# Patient Record
Sex: MALE | URBAN - METROPOLITAN AREA
[De-identification: names, ages, dates, MRNs, and addresses within clinical notes are randomized per-mention and may not be internally consistent; named-entity substitution may affect disease eponyms.]

---

## 2017-02-10 ENCOUNTER — TRANSFERRED RECORDS (OUTPATIENT)
Dept: HEALTH INFORMATION MANAGEMENT | Facility: CLINIC | Age: 32
End: 2017-02-10

## 2017-02-15 ENCOUNTER — TRANSFERRED RECORDS (OUTPATIENT)
Dept: HEALTH INFORMATION MANAGEMENT | Facility: CLINIC | Age: 32
End: 2017-02-15

## 2017-02-17 ENCOUNTER — PRE VISIT (OUTPATIENT)
Dept: NEUROLOGY | Facility: CLINIC | Age: 32
End: 2017-02-17

## 2017-02-17 NOTE — TELEPHONE ENCOUNTER
1.  Date/reason for appt:2/27/17, Phabdomyolysis  2.  Referring provider: GOVIND RODRIGUEZ  3.  Call to patient (Yes / No - short description):No, referred  4.  Previous care at / records requested from:   [facility]- faxed cover sheet

## 2017-02-20 NOTE — TELEPHONE ENCOUNTER
Records received from [facility].   Included:  ED notes- 1/6/16 to 1/15/16, 2/10/17 to 2/15/17  Radiology reports- CT abdomen pelvis on 1/7/16, US abdomen on 1/7/16, US venous upper ext right on 2/10/17  Other- Labs

## 2017-02-22 ASSESSMENT — ENCOUNTER SYMPTOMS
BACK PAIN: 0
POLYDIPSIA: 0
JOINT SWELLING: 1
HALLUCINATIONS: 0
FATIGUE: 0
NIGHT SWEATS: 0
INCREASED ENERGY: 1
DECREASED APPETITE: 0
WEIGHT GAIN: 0
MYALGIAS: 1
FEVER: 0
POLYPHAGIA: 0
MUSCLE CRAMPS: 0
ALTERED TEMPERATURE REGULATION: 0
MUSCLE WEAKNESS: 1
ARTHRALGIAS: 0
CHILLS: 0
NECK PAIN: 0
STIFFNESS: 1
WEIGHT LOSS: 0

## 2017-02-27 ENCOUNTER — APPOINTMENT (OUTPATIENT)
Dept: LAB | Facility: CLINIC | Age: 32
End: 2017-02-27
Attending: GENETIC COUNSELOR, MS
Payer: COMMERCIAL

## 2017-02-27 ENCOUNTER — OFFICE VISIT (OUTPATIENT)
Dept: NEUROLOGY | Facility: CLINIC | Age: 32
End: 2017-02-27

## 2017-02-27 VITALS
DIASTOLIC BLOOD PRESSURE: 81 MMHG | HEIGHT: 72 IN | SYSTOLIC BLOOD PRESSURE: 130 MMHG | HEART RATE: 64 BPM | BODY MASS INDEX: 24.38 KG/M2 | WEIGHT: 180 LBS

## 2017-02-27 DIAGNOSIS — G73.7 METABOLIC MYOPATHY: ICD-10-CM

## 2017-02-27 DIAGNOSIS — M62.82 NON-TRAUMATIC RHABDOMYOLYSIS: Primary | ICD-10-CM

## 2017-02-27 DIAGNOSIS — E88.9 METABOLIC MYOPATHY: ICD-10-CM

## 2017-02-27 ASSESSMENT — PAIN SCALES - GENERAL: PAINLEVEL: NO PAIN (1)

## 2017-02-27 NOTE — NURSING NOTE
Chief Complaint   Patient presents with     Consult     Muscular dystrophy    Shelly Bro, ABIDA

## 2017-02-27 NOTE — PROGRESS NOTES
2017             Sarah Cadet MD   [address] [address]      RE: Jose Gunn   MRN: [id number]   : 1985      Dear Dr. Cadet:      I had the pleasure to see Mr. Gunn at the [facility] today for recurrent rhabdomyolysis.  As you know, he is a 31-year-old, otherwise healthy male who has had 2 episodes of serious rhabdomyolysis in his life.  The first occurred in 2016 and required a 10 day hospitalization with hydration, and the second was in 2017 and required a 5-6 day hospitalization.  Prior to both occasions, he worked with a new gym , and he did exercise that he was unaccustomed to.  This included the first time repeated weightlifting and arm workout on a Monday followed by leg workout with bench presses/squatting on Wednesday.  The duration of exercise was about an hour to an hour and a half each time.  Shortly after the Wednesday in 2016, he felt severe soreness in his muscle, and he noticed discoloration in his urine.  He presented to a local ED in [place], and he had rhabdomyolsis with CK in the 120,000's.  He did not develop renal failure.  His symptoms resolved with hydration.  His CK normalized about a month to a month and a half later to 100-150.  His myalgias persisted to a milder extent for 2-3 months and then resolved.  He was able then to resume his workout at similar levels without limitations.  He did not describe second-wind phenomenon.  Then, by doing about the same amount of workout in 2017, he developed a similar presentation with CK in the 70,000's.  He again presented to [place].  He was hydrated, did not develop renal failure, and symptoms improved within a few days.  He now still has muscle soreness, and he has been prescribed oxycodone, which he takes p.r.n. but he was able to return to his work as a . He has stopped doing workouts.     His social history is significant  for drinking 3-5 alcoholic drinks a day, including beers or shots.  He used to smoke until 3 years ago, when he quit.  He has used cocaine and marijuana.  He adamantly denied cocaine use prior to the 2 rhabdo episodes in 01/2016 and 02/2017.  His past medical history is otherwise free.  He has a maternal uncle who possibly had similar myalgias and exercise intolerance, although he never developed clear-cut rhabdo to the best they know.  Otherwise, his 33-year-old brother does not have similar symptoms, and neither do his mother, father or other relatives.  He denies dysphagia, dysarthria, sialorrhea, head drop, ptosis, double vision, persistent weakness of his arms or legs, difficulty getting up from a chair or walking stairs, tripping on his feet, falls, sensory symptoms or sphincter dysfunction.  He has not had seizures, endocrinopathies or hearing loss.        PAST MEDICAL HISTORY:  Relatively unremarkable except for the rhabdo episodes.      PAST SURGICAL HISTORY:  Thumb ligament repair.      ALLERGIES:  NKDA.      REVIEW OF SYSTEMS:  A 14 point review of systems is negative except per HPI.      FAMILY AND SOCIAL HISTORY:  Outlined above.        CURRENT MEDICATIONS:  Oxycodone p.r.n.       PHYSICAL EXAMINATION:  He is a pleasant young man in no apparent distress.  He endorses pain of 0-1/10.  Blood pressure is 130/81, pulse 64 and regular, height 182 cm, BMI 24.41.  Neuro exam is normal, including mental status, cranial nerves, motor exam, muscle strength, tone, bulk, reflexes (note that the lower extremity reflexes are 1+ and require Jendrassik, but upper extremity reflexes are 2 brisk), plantar responses, sensory exam to vibration, light touch and joint position, finger-to-nose and heel-to-shin, stance and gait.  He has no scapular winging and no percussion or hand  myotonia.  He has no focal muscle atrophy, or hypertrophy, and no facial dysmorphism.       In summary, Mr. Gunn presents with 2 episodes of  post exertional rhabdomyolysis that were severe less than a year apart.  He definitely requires workup for underlying metabolic myopathy.  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  He does not report second-wind phenomenon, but given that the exercise that precipitated the rhabdo was short and intense as opposed to prolonged, glycogen storage disorder is still possible.  We will do next-generation DNA testing for CPT2, VLCAD deficiency and McArdle disease.  The patient will return to clinic for followup in 3 months.  Depending on the results, we will make appropriate recommendations on how to prevent recurrent rhabdo episodes.  If the results are negative, he may need further DNA testing or a muscle biopsy.  I advised him against workouts until we obtain the test results.  He agreed with the above plan. TT spent for patient care 35 minutes; more than half was counseling.      Sincerely,      MD SCOTT Small MD             D: 2017 08:26   T: 2017 12:42   MT: eliot      Name:     SUGEY MCDONALD   MRN:      [id number]        Account:      [id number]   :      1985           Service Date: 2017      Document: [id number]

## 2017-02-27 NOTE — MR AVS SNAPSHOT
After Visit Summary   2/27/2017    Jose Gunn    MRN: [id number]           Patient Information     Date Of Birth          1985        Visit Information        Provider Department      2/27/2017 7:50 AM Sajan Li MD [facility] Neurology        Today's Diagnoses     Metabolic myopathy    -  1      Care Instructions    Please refrain from excessive exercise.        Follow-ups after your visit        Your next 10 appointments already scheduled     Feb 27, 2017  9:00 AM CST   LAB with  LAB   [facility] ([facility])    [address] [place] [id number] [phone]           Patient must bring picture ID.  Patient should be prepared to give a urine specimen  Please do not eat 10-12 hours before your appointment if you are coming in fasting for labs on lipids, cholesterol, or glucose (sugar).  Pregnant women should follow their Care Team instructions. Water with medications is okay. Do not drink coffee or other fluids.   If you have concerns about taking  your medications, please ask at office or if scheduling via [employer], send a message by clicking on Secure Messaging, Message Your Care Team.            May 22, 2017  8:50 AM CDT   (Arrive by 8:35 AM)   Return Muscular Dystrophy with Sajan Li MD   [facility] ([facility])    [address] [place] [id number] [phone]              Future tests that were ordered for you today     Open Future Orders        Priority Expected Expires Ordered    Next Generation Sequencing Routine  2/27/2018 2/27/2017            Who to contact     Please call your clinic at [phone] to:    Ask questions about your health    Make or cancel appointments    Discuss your medicines    Learn about your test results    Speak to your doctor   If you have compliments or concerns about an experience at your clinic, or if you  wish to file a complaint, please contact [facility] Patient Relations at [phone] or email us at [email or web address]         Additional Information About Your Visit        [employer] Information     [employer] gives you secure access to your electronic health record. If you see a primary care provider, you can also send messages to your care team and make appointments. If you have questions, please call your primary care clinic.  If you do not have a primary care provider, please call [phone] and they will assist you.      [employer] is an electronic gateway that provides easy, online access to your medical records. With [employer], you can request a clinic appointment, read your test results, renew a prescription or communicate with your care team.     To access your existing account, please contact your [facility] or call [phone] for assistance.        Care EveryWhere ID     This is your Care EveryWhere ID. This could be used by other organizations to access your [place] medical records  [id number]        Your Vitals Were     Pulse Height BMI (Body Mass Index)             64 1.829 m (6') 24.41 kg/m2          Blood Pressure from Last 3 Encounters:   02/27/17 130/81    Weight from Last 3 Encounters:   02/27/17 81.6 kg (180 lb)               Primary Care Provider    Pcp Unknown Verified       No address on file        Thank you!     Thank you for choosing [facility]  for your care. Our goal is always to provide you with excellent care. Hearing back from our patients is one way we can continue to improve our services. Please take a few minutes to complete the written survey that you may receive in the mail after your visit with us. Thank you!             Your Updated Medication List - Protect others around you: Learn how to safely use, store and throw away your medicines at www.disposemymeds.org.      Notice  As of 2/27/2017  8:29 AM     You have not been prescribed any medications.

## 2017-02-27 NOTE — LETTER
2017       RE: Jose Gunn  [address] [address]     Dear Colleague,    Thank you for referring your patient, Joes Gunn, to the [facility] NEUROLOGY at [facility]. Please see a copy of my visit note below.    2017       Sarah Cadet MD   [address] [address]      RE: Jose Gunn   MRN: [id number]   : 1985      Dear Dr. Cadet:      I had the pleasure to see Mr. Gunn at the [facility] today for recurrent rhabdomyolysis.  As you know, he is a 31-year-old, otherwise healthy male who has had 2 episodes of serious rhabdomyolysis in his life.  The first occurred in 2016 and required a 10 day hospitalization with hydration, and the second was in 2017 and required a 5-6 day hospitalization.  Prior to both occasions, he worked with a new gym , and he did exercise that he was unaccustomed to.  This included the first time repeated weightlifting and arm workout on a Monday followed by leg workout with bench presses/squatting on Wednesday.  The duration of exercise was about an hour to an hour and a half each time.  Shortly after the Wednesday in 2016, he felt severe soreness in his muscle, and he noticed discoloration in his urine.  He presented to a local ED in [place], and he had rhabdomyolsis with CK in the 120,000's.  He did not develop renal failure.  His symptoms resolved with hydration.  His CK normalized about a month to a month and a half later to 100-150.  His myalgias persisted to a milder extent for 2-3 months and then resolved.  He was able then to resume his workout at similar levels without limitations.  He did not describe second-wind phenomenon.  Then, by doing about the same amount of workout in 2017, he developed a similar presentation with CK in the 70,000's.  He again presented to [place].  He was hydrated, did not develop renal  failure, and symptoms improved within a few days.  He now still has muscle soreness, and he has been prescribed oxycodone, which he takes p.r.n. but he was able to return to his work as a . He has stopped doing workouts.     His social history is significant for drinking 3-5 alcoholic drinks a day, including beers or shots.  He used to smoke until 3 years ago, when he quit.  He has used cocaine and marijuana.  He adamantly denied cocaine use prior to the 2 rhabdo episodes in 01/2016 and 02/2017.  His past medical history is otherwise free.  He has a maternal uncle who possibly had similar myalgias and exercise intolerance, although he never developed clear-cut rhabdo to the best they know.  Otherwise, his 33-year-old brother does not have similar symptoms, and neither do his mother, father or other relatives.  He denies dysphagia, dysarthria, sialorrhea, head drop, ptosis, double vision, persistent weakness of his arms or legs, difficulty getting up from a chair or walking stairs, tripping on his feet, falls, sensory symptoms or sphincter dysfunction.  He has not had seizures, endocrinopathies or hearing loss.        PAST MEDICAL HISTORY:  Relatively unremarkable except for the rhabdo episodes.      PAST SURGICAL HISTORY:  Thumb ligament repair.      ALLERGIES:  NKDA.      REVIEW OF SYSTEMS:  A 14 point review of systems is negative except per HPI.      FAMILY AND SOCIAL HISTORY:  Outlined above.        CURRENT MEDICATIONS:  Oxycodone p.r.n.       PHYSICAL EXAMINATION:  He is a pleasant young man in no apparent distress.  He endorses pain of 0-1/10.  Blood pressure is 130/81, pulse 64 and regular, height 182 cm, BMI 24.41.  Neuro exam is normal, including mental status, cranial nerves, motor exam, muscle strength, tone, bulk, reflexes (note that the lower extremity reflexes are 1+ and require Jendrassik, but upper extremity reflexes are 2 brisk), plantar responses, sensory exam to vibration, light touch and  joint position, finger-to-nose and heel-to-shin, stance and gait.  He has no scapular winging and no percussion or hand  myotonia.  He has no focal muscle atrophy, or hypertrophy, and no facial dysmorphism.       In summary, Mr. Mcdonald presents with 2 episodes of post exertional rhabdomyolysis that were severe less than a year apart.  He definitely requires workup for underlying metabolic myopathy.  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  He does not report second-wind phenomenon, but given that the exercise that precipitated the rhabdo was short and intense as opposed to prolonged, glycogen storage disorder is still possible.  We will do next-generation DNA testing for CPT2, VLCAD deficiency and McArdle disease.  The patient will return to clinic for followup in 3 months.  Depending on the results, we will make appropriate recommendations on how to prevent recurrent rhabdo episodes.  If the results are negative, he may need further DNA testing or a muscle biopsy.  I advised him against workouts until we obtain the test results.  He agreed with the above plan. TT spent for patient care 35 minutes; more than half was counseling.      Sincerely,      Sajan Li MD     D: 2017 08:26   T: 2017 12:42   MT: eliot      Name:     SUGEY MCDONALD   MRN:      [id number]        Account:      [id number]   :      1985           Service Date: 2017      Document: [id number]

## 2017-02-27 NOTE — PROGRESS NOTES
Answers for HPI/ROS submitted by the patient on 2/22/2017   General Symptoms: Yes  Skin Symptoms: No  HENT Symptoms: No  EYE SYMPTOMS: No  HEART SYMPTOMS: No  LUNG SYMPTOMS: No  INTESTINAL SYMPTOMS: No  URINARY SYMPTOMS: No  REPRODUCTIVE SYMPTOMS: No  SKELETAL SYMPTOMS: Yes  BLOOD SYMPTOMS: No  NERVOUS SYSTEM SYMPTOMS: No  MENTAL HEALTH SYMPTOMS: No  Fever: No  Loss of appetite: No  Weight loss: No  Weight gain: No  Fatigue: No  Night sweats: No  Chills: No  Increased stress: No  Excessive hunger: No  Excessive thirst: No  Feeling hot or cold when others believe the temperature is normal: No  Loss of height: No  Post-operative complications: No  Surgical site pain: No  Hallucinations: No  Change in or Loss of Energy: Yes  Hyperactivity: No  Confusion: No  Back pain: No  Muscle aches: Yes  Neck pain: No  Swollen joints: Yes  Joint pain: No  Bone pain: No  Muscle cramps: No  Muscle weakness: Yes  Joint stiffness: Yes  Bone fracture: No

## 2017-02-27 NOTE — MR AVS SNAPSHOT
After Visit Summary   2/27/2017    Jose Gunn    MRN: [id number]           Patient Information     Date Of Birth          1985        Visit Information        Provider Department      2/27/2017 7:50 AM Winnie Shabazz GC [facility] Neurology        Today's Diagnoses     Non-traumatic rhabdomyolysis    -  1       Follow-ups after your visit        Your next 10 appointments already scheduled     May 22, 2017  8:50 AM CDT   (Arrive by 8:35 AM)   Return Muscular Dystrophy with MD ZHEN Singh [facility] Neurology ([facility])    [address] [place] [address] [phone]              Who to contact     Please call your clinic at [phone] to:    Ask questions about your health    Make or cancel appointments    Discuss your medicines    Learn about your test results    Speak to your doctor   If you have compliments or concerns about an experience at your clinic, or if you wish to file a complaint, please contact [facility] Patient Relations at [phone] or email us at [email or web address]         Additional Information About Your Visit        MyChart Information     [employer] gives you secure access to your electronic health record. If you see a primary care provider, you can also send messages to your care team and make appointments. If you have questions, please call your primary care clinic.  If you do not have a primary care provider, please call [phone] and they will assist you.      [employer] is an electronic gateway that provides easy, online access to your medical records. With [employer], you can request a clinic appointment, read your test results, renew a prescription or communicate with your care team.     To access your existing account, please contact your [facility] or call [phone] for assistance.        Care EveryWhere ID     This is  your Care EveryWhere ID. This could be used by other organizations to access your [place] medical records  [id number]         Blood Pressure from Last 3 Encounters:   02/27/17 130/81    Weight from Last 3 Encounters:   02/27/17 81.6 kg (180 lb)              Today, you had the following     No orders found for display       Primary Care Provider    Pcp Unknown Verified       No address on file        Thank you!     Thank you for choosing [facility] NEUROLOGY  for your care. Our goal is always to provide you with excellent care. Hearing back from our patients is one way we can continue to improve our services. Please take a few minutes to complete the written survey that you may receive in the mail after your visit with us. Thank you!             Your Updated Medication List - Protect others around you: Learn how to safely use, store and throw away your medicines at www.disposemymeds.org.      Notice  As of 2/27/2017 11:59 PM    You have not been prescribed any medications.

## 2017-02-27 NOTE — LETTER
2/27/2017       RE: Jose Gunn  [address] [address]     Dear Colleague,    Thank you for referring your patient, Jose Gunn, to the [facility] NEUROLOGY at [facility]. Please see a copy of my visit note below.    MDA GENETIC COUNSELING CONSULT  Jose was seen with his wife for a genetic counseling consult at the request of Dr. Li to discuss genetic testing for metabolic myopathy. Jose has had two episodes of serious rhabdomyolysis of unknown etiology.    FAMILY HISTORY  A detailed family history was taken and scanned into Jose s medical record. Jose' family history for the followign:    His maternal uncle was reported to have recurrent muscle cramps      Ethnic background is Scandinavian, Mohawk and English.    GENETIC COUNSELING  1. Metabolic myopathies are group of conditions that can cause muscle weakness and pain because of imparitment energy-generating processes in skeletal muscles.  These myopathies can cause progressive muscle weakness, fatigue, episodes of pain and cramps after exercise, and/or extensive death and breakdown of muscle tissue.  Some myopathies can cause rhabdomyolysis, which is rapid muscle breakdown, that can become quite severe and can lead to kidney failure.    2. The metabolic myopathies is typically caused by mutations in the genes that encode proteins in the different pathway in the muscle that create energy in the muscles.  When there is a mutation in the genes it results breakdown in the pathway.  This ultimately results in a reduction in fuel for muscle function.  Depending what protein or where in the pathway there can be different levels of severity or symptoms present.    3. Metabolic myopathies are typically inherited in an autosomal recessive pattern.  Autosomal means that the genes involved are not located on the sex chromosomes, therefore both males and females can be affected.  Recessive means that both  copies of a gene pair would have to have a mutation in them to cause the condition.  An individual with a change in only copy of gene in a recessive condition is a carrier.  A carrier typically has no symptoms of the condition.  If both parents are carriers, there is a 1 out of 4 (25%) chance, in each pregnancy, to have a child who is affected.  In recessive inheritance only siblings are at risk to be affected.  All children of an affected individual would be a carrier.  The only way they could be affected is if their unaffected parent was a carrier.  The population carrier frequency is approximately ranges depending on the subtype.  Genetic testing to determine if someone is a carrier can be performed.    4. Metabolic myopathies are typically diagnosed by evaluating the activity level of different muscle proteins or the presence of metabolites in the muscle.  If the activity is absent (not working at all) or markedly reduced (working a little) it typically confirms the diagnosis of this condition.  Testing the level in the muscle is considered the gold standard.  Genetic testing provides further confirmation.    5. Genetic testing for metabolic myopathy can be done in step-wise (one gene at a time) or in panel.  Based on Jose's work-up the following was recommended: CPT2, VLCAD deficiency and McArdle disease (CPT2, ACADVL and PYGM genes)    6. Due to the heterogeneous nature of this condition there is a reasonable probability of detecting a genetic mutation using this test. Genetic confirmation has a significant likelihood of providing my patient and his/her family with an accurate diagnosis, thereby ensuring appropriate medical management. In many cases, a diagnosis can lead to a specific treatment or management strategy that dramatically changes the clinical outcome. A diagnosis can also help identify necessary medical referrals, screening for associated complications, and recurrence risk counseling. Once a  diagnosis is made, the costs associated with further testing are likely to decrease.We discussed that some time results form genetic testing can be inconclusive and follow-up testing may be warranted.    7. All immediate questions were answered.  My contact information was provided for any additional questions or concerns.  Twenty minutes was spent with the patient and more than 50% of the time was spent in counseling and coordination of care.     PLAN  1. Jose elected to proceed with  had his blood drawn on March 9, 2015 for CPT2, ACADVL and PYGM genes testing to be performed at [facility] molecular diagnostics laboratory.   2. Prior authorization was submitted.  3. I will contact Jose with results, which I anticipate will take 4-6 weeks.    Winnie Shabazz MS, [facility]  Genetic Counselor  Phone: [phone]

## 2017-02-28 LAB — COPATH REPORT: NORMAL

## 2017-03-01 NOTE — PROGRESS NOTES
[facility] GENETIC COUNSELING CONSULT  Jose was seen with his wife for a genetic counseling consult at the request of Dr. Li to discuss genetic testing for metabolic myopathy. Jose has had two episodes of serious rhabdomyolysis of unknown etiology.    FAMILY HISTORY  A detailed family history was taken and scanned into Jose s medical record. Jose' family history for the followign:    His maternal uncle was reported to have recurrent muscle cramps      Ethnic background is Scandinavian, Yoruba and English.    GENETIC COUNSELING  1. Metabolic myopathies are group of conditions that can cause muscle weakness and pain because of imparitment energy-generating processes in skeletal muscles.  These myopathies can cause progressive muscle weakness, fatigue, episodes of pain and cramps after exercise, and/or extensive death and breakdown of muscle tissue.  Some myopathies can cause rhabdomyolysis, which is rapid muscle breakdown, that can become quite severe and can lead to kidney failure.    2. The metabolic myopathies is typically caused by mutations in the genes that encode proteins in the different pathway in the muscle that create energy in the muscles.  When there is a mutation in the genes it results breakdown in the pathway.  This ultimately results in a reduction in fuel for muscle function.  Depending what protein or where in the pathway there can be different levels of severity or symptoms present.    3. Metabolic myopathies are typically inherited in an autosomal recessive pattern.  Autosomal means that the genes involved are not located on the sex chromosomes, therefore both males and females can be affected.  Recessive means that both copies of a gene pair would have to have a mutation in them to cause the condition.  An individual with a change in only copy of gene in a recessive condition is a carrier.  A carrier typically has no symptoms of the condition.  If both parents are carriers, there is a 1 out of 4  (25%) chance, in each pregnancy, to have a child who is affected.  In recessive inheritance only siblings are at risk to be affected.  All children of an affected individual would be a carrier.  The only way they could be affected is if their unaffected parent was a carrier.  The population carrier frequency is approximately ranges depending on the subtype.  Genetic testing to determine if someone is a carrier can be performed.    4. Metabolic myopathies are typically diagnosed by evaluating the activity level of different muscle proteins or the presence of metabolites in the muscle.  If the activity is absent (not working at all) or markedly reduced (working a little) it typically confirms the diagnosis of this condition.  Testing the level in the muscle is considered the gold standard.  Genetic testing provides further confirmation.    5. Genetic testing for metabolic myopathy can be done in step-wise (one gene at a time) or in panel.  Based on Jose's work-up the following was recommended: CPT2, VLCAD deficiency and McArdle disease (CPT2, ACADVL and PYGM genes)    6. Due to the heterogeneous nature of this condition there is a reasonable probability of detecting a genetic mutation using this test. Genetic confirmation has a significant likelihood of providing my patient and his/her family with an accurate diagnosis, thereby ensuring appropriate medical management. In many cases, a diagnosis can lead to a specific treatment or management strategy that dramatically changes the clinical outcome. A diagnosis can also help identify necessary medical referrals, screening for associated complications, and recurrence risk counseling. Once a diagnosis is made, the costs associated with further testing are likely to decrease.We discussed that some time results form genetic testing can be inconclusive and follow-up testing may be warranted.    7. All immediate questions were answered.  My contact information was provided for  any additional questions or concerns.  Twenty minutes was spent with the patient and more than 50% of the time was spent in counseling and coordination of care.     PLAN  1. Jose elected to proceed with  had his blood drawn on March 9, 2015 for CPT2, ACADVL and PYGM genes testing to be performed at [facility] molecular diagnostics laboratory.   2. Prior authorization was submitted.  3. I will contact Jose with results, which I anticipate will take 4-6 weeks.    Winnie Shabazz MS, [facility] [place]  Genetic Counselor  Phone: [phone]

## 2017-04-27 ENCOUNTER — APPOINTMENT (OUTPATIENT)
Dept: LAB | Facility: CLINIC | Age: 32
End: 2017-04-27
Attending: GENETIC COUNSELOR, MS
Payer: COMMERCIAL

## 2017-04-27 DIAGNOSIS — M62.82 NON-TRAUMATIC RHABDOMYOLYSIS: Primary | ICD-10-CM

## 2017-04-27 PROCEDURE — 81406 MOPATH PROCEDURE LEVEL 7: CPT | Mod: 91

## 2017-04-27 PROCEDURE — 81406 MOPATH PROCEDURE LEVEL 7: CPT | Performed by: GENETIC COUNSELOR, MS

## 2017-04-27 PROCEDURE — 81404 MOPATH PROCEDURE LEVEL 5: CPT | Performed by: GENETIC COUNSELOR, MS

## 2017-04-27 PROCEDURE — 81479 UNLISTED MOLECULAR PATHOLOGY: CPT | Performed by: GENETIC COUNSELOR, MS

## 2017-05-22 ENCOUNTER — RESEARCH ENCOUNTER (OUTPATIENT)
Dept: NEUROLOGY | Facility: CLINIC | Age: 32
End: 2017-05-22

## 2017-05-22 ENCOUNTER — OFFICE VISIT (OUTPATIENT)
Dept: NEUROLOGY | Facility: CLINIC | Age: 32
End: 2017-05-22

## 2017-05-22 VITALS
HEART RATE: 77 BPM | BODY MASS INDEX: 24.53 KG/M2 | WEIGHT: 185.1 LBS | SYSTOLIC BLOOD PRESSURE: 138 MMHG | DIASTOLIC BLOOD PRESSURE: 90 MMHG | HEIGHT: 73 IN

## 2017-05-22 DIAGNOSIS — M62.82 NON-TRAUMATIC RHABDOMYOLYSIS: Primary | ICD-10-CM

## 2017-05-22 ASSESSMENT — PAIN SCALES - GENERAL: PAINLEVEL: NO PAIN (0)

## 2017-05-22 NOTE — LETTER
5/22/2017       RE: Jose Gunn  [address] [address]     Dear Colleague,    Thank you for referring your patient, Jose Gunn, to the [facility] NEUROLOGY at [facility]. Please see a copy of my visit note below.    [facility] GENETIC COUNSELING NOTE  Jose was seen with his wife for a genetic counseling consult at the request of Dr. Li to discuss genetic testing for metabolic myopathy. Jose has had two episodes of serious rhabdomyolysis of unknown etiology.     FAMILY HISTORY  A detailed family history was taken and scanned into Jose s medical record. Jose' family history for the following:    His maternal uncle was reported to have recurrent muscle cramps      Ethnic background is Scandinavian, British and English.    SUMMARY OF PAST GENETIC TESTING   To date testing has not identified an etiology. 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  He has had an extensive work-up for this condition and etiology remains unknown.  Due to the heterogeneous nature of this condition comprehensive testing is necessary to determine the cause. Genetic confirmation has a significant likelihood of providing  an accurate diagnosis, thereby ensuring appropriate medical management. In many cases, a diagnosis can lead to a specific treatment or management strategy that dramatically changes the clinical outcome. A diagnosis can also help identify necessary medical referrals, screening for associated complications, and recurrence risk counseling. Once a diagnosis is made, the costs associated with further testing are likely to decrease.    Whole exome sequencing (UMANG):  We discussed how UMANG looks at the exome or the coding parts of the genes to look for gene changes that may explain Jose's symptoms. UMANG can accurately evaluate around  90-95% of the exome. However, the exome is still a small portion of a person's total DNA. Therefore, although UMANG analyzes the DNA of close to 20,000 genes, there are limitations with this testing method. Approximately 30% of individuals who undergo UMANG will have a positive result, leading to a diagnosis.  Many will not have an identifiable change or mutation using UMANG; this does not rule out a genetic cause for the patient's symptoms.     UMANG Results:  We reviewed that there are three types of results that can be obtained from UMANG:  o One possibility is a change(s) could be seen in Jose and this change(s) is known to cause similar symptoms to the symptoms Jose has experienced. This is considered a positive result. A positive result may provide more information on appropriate clinical management for Jose and may provide information on additional potential health risks associated with Jose's diagnosis. A positive result can also have implications for the health and reproductive risks of other relatives.   o It is also possible that no change(s) that are likely to explain Jose's symptoms are found from UMANG. This is considered a negative result. A negative result would not completely rule out a possible genetic cause for Jose's symptoms. We discussed that in the event of a negative result, there additional options for re-analysis of the results. Including research evaluation of results and review in a year using new information obtained about genetic etiology of conditions.  o Not all changes in our genes cause disease. Sometimes, it can be difficult for the laboratory to determine whether or not a change that is found contributes to the patient's symptoms. If the meaning of a particular gene change is unknown, the lab classifies the result as a variant of unknown significance  Due to the heterogeneous nature of this condition there is a reasonable probability of detecting a genetic mutation using this test. Genetic  confirmation has a significant likelihood of providing my patient and his family with an accurate diagnosis, thereby ensuring appropriate medical management. In many cases, a diagnosis can lead to a specific treatment or management strategy that dramatically changes the clinical outcome. A diagnosis can also help identify necessary medical referrals, screening for associated complications, and recurrence risk counseling. Once a diagnosis is made, the costs associated with further testing are likely to decrease..     Parental Samples:  We discussed that samples from Jose's parents or other relative could be included in the analysis to help determine if gene changes that are found are disease causing or benign. Only changes that are found in Jose that may contributed to his symptoms will be tested for in his parents and only gene changes that the laboratory believes may contribute to Jose's symptoms will be reported. Changes and variants in genes that are not thought to contribute to Jose's symptoms will not be included in the results report and will not be tested for in the parents.     ACMG Secondary Findings:  We reviewed that [facility] may also report the results of gene mutations that are found in 56 genes recommended by the American College of Medical Genetics and Genomics (ACMG) to be reported to UMANG patients even if the gene mutation does not contribute to their current symptoms. Jose was given the option to learn the results of these 56 genes (i.e. secondary findings).    Insurance Coverage for UMANG:  A benefits inquiry will be completed by [employer] and an estimated out of pocket expense will be provided. The cost will not exceed this quoted price. A further price reduction is possible if family income data is provided to [employer]. [employer] will bill the insurance company directly for this testing.   PLAN    Jose will be contacted with out of pocket costs for testing and a follow-up plan will be made.    A copy of the  test information sheet was provided to the Jose    Contact information was provided. Additional questions or concerns were denied.    Winnie Shabazz MS, [facility] [place] [facility] [place]  Genetic Counselor  Phone: [phone]    Twenty minutes was spent with the patient and more than 50% of the time was spent in counseling and coordination of care.

## 2017-05-22 NOTE — LETTER
"2017       RE: Jose Gunn  [address] [address]     Dear Colleague,    Thank you for referring your patient, Jose Gunn, to the [facility] NEUROLOGY at [facility]. Please see a copy of my visit note below.    May 22, 2017      Sarah Cadet MD   [address]   [address]      RE: Jose Gunn   MRN: [id number]    : 1985   Dear Dr. Fernandez:      I had the pleasure to see Mr. Gunn in followup at the [facility] today.  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 He has had 2 episodes with rhabdo and CK values over 50,000's, 1 in 2016 and another one in 2017, after doing gym workouts.  He did those indoors and he was not fasting.  He does not have a clear family history of the same condition, although he has a maternal uncle that may have had some exercise intolerance and myalgias, although no clearcut myoglobinuria.  During his last visit, and because of high suspicion for an underlying metabolic myopathy, we did next generation sequencing for CPT2, VLCAD and PYGM gene mutations, which are the 3 most common genes associated with recurrent rhabdomyolysis in adults.  They were all negative.  Jose is here to review the results.  He has not had any new muscle complaints.    I did not repeat a physical exam (previously normal). His vital signs are as follows:    /90  Pulse 77  Ht 1.854 m (6' 1\")  Wt 84 kg (185 lb 1.6 oz)  BMI 24.42 kg/m2     I spent about 10 minutes with Jose of which more than half was counseling.  I told him that I would like to investigate his problem further since the first-tier genetic workup was negative.  The options are either a muscle biopsy or doing a more extensive next generation or whole exome sequencing DNA test for other genetic conditions causing hereditary rhabdomyolysis (there are more than 40). He elected to do the latter.  " He will discuss this with our genetic counselor.  Once the test is approved by his insurance and we obtain results, I am going to contact him for followup.      Thank you for allowing me to participate in his care.      Sincerely,    Sajan Li MD

## 2017-05-22 NOTE — MR AVS SNAPSHOT
After Visit Summary   5/22/2017    Jose Gunn    MRN: [id number]           Patient Information     Date Of Birth          1985        Visit Information        Provider Department      5/22/2017 8:50 AM Sajan Li MD [facility] Neurology        Care Instructions    If you have questions or concerns following today's appointment, please contact   Yesy Ramirez at [phone].    For more urgent concerns, contact the neurology department triage line at [phone] option 3.    Follow up with Dr. Li depends on results of genetic testing. We will contact you with these results.      We now have a  available to help patients with psychosocial needs, supportive counseling, advanced care planning, and insurance and/or disability questions. You can reach TIMOTHY Santana, [facility] at [phone].            Follow-ups after your visit        Who to contact     Please call your clinic at [phone] to:    Ask questions about your health    Make or cancel appointments    Discuss your medicines    Learn about your test results    Speak to your doctor   If you have compliments or concerns about an experience at your clinic, or if you wish to file a complaint, please contact [facility] Patient Relations at [phone] or email us at [email or web address]         Additional Information About Your Visit        MyChart Information     [employer] gives you secure access to your electronic health record. If you see a primary care provider, you can also send messages to your care team and make appointments. If you have questions, please call your primary care clinic.  If you do not have a primary care provider, please call [phone] and they will assist you.      [employer] is an electronic gateway that provides easy, online access to your medical records. With [employer], you can request a clinic appointment, read your test results, renew  "a prescription or communicate with your care team.     To access your existing account, please contact your [facility] or call [phone] for assistance.        Care EveryWhere ID     This is your Care EveryWhere ID. This could be used by other organizations to access your [facility] medical records  [id number]        Your Vitals Were     Pulse Height BMI (Body Mass Index)             77 1.854 m (6' 1\") 24.42 kg/m2          Blood Pressure from Last 3 Encounters:   05/22/17 138/90   02/27/17 130/81    Weight from Last 3 Encounters:   05/22/17 84 kg (185 lb 1.6 oz)   02/27/17 81.6 kg (180 lb)              Today, you had the following     No orders found for display       Primary Care Provider    Pcp Unknown Verified       No address on file        Thank you!     Thank you for choosing [facility] NEUROLOGY  for your care. Our goal is always to provide you with excellent care. Hearing back from our patients is one way we can continue to improve our services. Please take a few minutes to complete the written survey that you may receive in the mail after your visit with us. Thank you!             Your Updated Medication List - Protect others around you: Learn how to safely use, store and throw away your medicines at www.disposemymeds.org.      Notice  As of 5/22/2017  8:58 AM    You have not been prescribed any medications.      "

## 2017-05-22 NOTE — PROGRESS NOTES
"May 22, 2017      Sarah Cadet MD   [address]   [address]      RE: Jose Gunn   MRN: [id number]    : 1985      Dear Dr. Fernandez:      I had the pleasure to see Mr. Gunn in followup at the [facility] today.  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 He has had 2 episodes with rhabdo and CK values over 50,000's, 1 in 2016 and another one in 2017, after doing gym workouts.  He did those indoors and he was not fasting.  He does not have a clear family history of the same condition, although he has a maternal uncle that may have had some exercise intolerance and myalgias, although no clearcut myoglobinuria.  During his last visit, and because of high suspicion for an underlying metabolic myopathy, we did next generation sequencing for CPT2, VLCAD and PYGM gene mutations, which are the 3 most common genes associated with recurrent rhabdomyolysis in adults.  They were all negative.  Jose is here to review the results.  He has not had any new muscle complaints.    I did not repeat a physical exam (previously normal). His vital signs are as follows:    /90  Pulse 77  Ht 1.854 m (6' 1\")  Wt 84 kg (185 lb 1.6 oz)  BMI 24.42 kg/m2     I spent about 10 minutes with Jose of which more than half was counseling.  I told him that I would like to investigate his problem further since the first-tier genetic workup was negative.  The options are either a muscle biopsy or doing a more extensive next generation or whole exome sequencing DNA test for other genetic conditions causing hereditary rhabdomyolysis (there are more than 40). He elected to do the latter.  He will discuss this with our genetic counselor.  Once the test is approved by his insurance and we obtain results, I am going to contact him for followup.      Thank you for allowing me to participate in his care.      Sincerely,       Sajan Li MD       "   SCOTT LIMON MD             D: 2017 08:59   T: 2017 11:13   MT: NEELAM      Name:     SUGEY MCDONALD   MRN:      [id number]        Account:      [id number]   :      1985           Service Date: 2017      Document: [id number]

## 2017-05-22 NOTE — PATIENT INSTRUCTIONS
If you have questions or concerns following today's appointment, please contact   Yesy Ramirez at [phone].    For more urgent concerns, contact the neurology department triage line at [phone] option 3.    Follow up with Dr. Li depends on results of genetic testing. We will contact you with these results.      We now have a  available to help patients with psychosocial needs, supportive counseling, advanced care planning, and insurance and/or disability questions. You can reach TIMOTHY Santana, LICSW at [phone].

## 2017-05-22 NOTE — PROGRESS NOTES
[facility]rist[facility] Neuromuscular Registry    IRB # [id number]  PI: Sherwin Abdalla MD, PhD  : Emily Doyle    Patient was approached for possible participation for the above study. The current approved IRB consent form was discussed and explained to the patient.  It was discussed that involvement with the study is voluntary and refusal to participate would not involve penalty or decrease benefits at which the patient is entitled, and the subject may discontinue his/her involvement at any time without penalty or loss in benefits. We also discussed that this study does not have follow up visits or procedures. Patient was informed that an additional contact might occur if data needed was not found in patient s medical record. The patient was given time to review and ask any questions about the consent. Patient was shown contact information for PI and study staff in consent for future questions. Patient verbalized understanding of consent and study by restating the purpose, procedures, duration, risk, confidentiality of PHI, and voluntarily participation. Patient printed, signed and dated the consent and HIPAA form prior to study involvement. Patient refused a copy of consent and HIPAA form.    Subject Consent/HIPAA : SIGNED ON 5.22.2017

## 2017-05-22 NOTE — MR AVS SNAPSHOT
After Visit Summary   5/22/2017    Jose Gunn    MRN: [id number]           Patient Information     Date Of Birth          1985        Visit Information        Provider Department      5/22/2017 8:50 AM Winnie Shabazz GC [facility] Neurology        Today's Diagnoses     Non-traumatic rhabdomyolysis    -  1       Follow-ups after your visit        Who to contact     Please call your clinic at [phone] to:    Ask questions about your health    Make or cancel appointments    Discuss your medicines    Learn about your test results    Speak to your doctor   If you have compliments or concerns about an experience at your clinic, or if you wish to file a complaint, please contact [facility] Patient Relations at [phone] or email us at [email or web address]         Additional Information About Your Visit        MyChart Information     [employer] gives you secure access to your electronic health record. If you see a primary care provider, you can also send messages to your care team and make appointments. If you have questions, please call your primary care clinic.  If you do not have a primary care provider, please call [phone] and they will assist you.      [employer] is an electronic gateway that provides easy, online access to your medical records. With [employer], you can request a clinic appointment, read your test results, renew a prescription or communicate with your care team.     To access your existing account, please contact your [facility] or call [phone] for assistance.        Care EveryWhere ID     This is your Care EveryWhere ID. This could be used by other organizations to access your [facility] medical records  [id number]         Blood Pressure from Last 3 Encounters:   05/22/17 138/90   02/27/17 130/81    Weight from Last 3 Encounters:   05/22/17 185 lb 1.6 oz (84 kg)   02/27/17 180 lb (81.6 kg)               Today, you had the following     No orders found for display       Primary Care Provider    Pcp Unknown Verified       No address on file        Equal Access to Services     CHRISTY WARNERCOMFORT : Hadii aad ku hadharshmathew Marks, jodiejennifer pacheco, kim renelinhjennifer casianoani, lilian haydein hayaadianelys millerying bob kati concepción. So [facility] [phone].    ATENCIÓN: Si habla español, tiene a garcia disposición servicios gratuitos de asistencia lingüística. Llame al [phone].    We comply with applicable federal civil rights laws and [place] laws. We do not discriminate on the basis of race, color, national origin, age, disability sex, sexual orientation or gender identity.            Thank you!     Thank you for choosing [facility] NEUROLOGY  for your care. Our goal is always to provide you with excellent care. Hearing back from our patients is one way we can continue to improve our services. Please take a few minutes to complete the written survey that you may receive in the mail after your visit with us. Thank you!             Your Updated Medication List - Protect others around you: Learn how to safely use, store and throw away your medicines at www.disposemymeds.org.      Notice  As of 5/22/2017 11:59 PM    You have not been prescribed any medications.

## 2017-05-23 NOTE — PROGRESS NOTES
Answers for HPI/ROS submitted by the patient on 5/21/2017   General Symptoms: No  Skin Symptoms: No  HENT Symptoms: No  EYE SYMPTOMS: No  HEART SYMPTOMS: No  LUNG SYMPTOMS: No  INTESTINAL SYMPTOMS: No  URINARY SYMPTOMS: No  REPRODUCTIVE SYMPTOMS: No  SKELETAL SYMPTOMS: No  BLOOD SYMPTOMS: No  NERVOUS SYSTEM SYMPTOMS: No  MENTAL HEALTH SYMPTOMS: No

## 2017-06-06 LAB — COPATH REPORT: NORMAL

## 2017-06-23 ENCOUNTER — TELEPHONE (OUTPATIENT)
Dept: NEUROLOGY | Facility: CLINIC | Age: 32
End: 2017-06-23

## 2017-06-24 NOTE — TELEPHONE ENCOUNTER
Called with BI results for whole exome sequencing:    Based on the insurance information provided for patient Jose Gunn  1985 the out-of-pocket cost for test 690a is $0.00. Please reference patient under case #[id number].    BI Valid Until: 2017      Best Regards,    Sera Rodriguez    [facility]  [address] [place] [address]  Office:  [phone]  Fax:  [phone]    Winnie Shabazz MS, [facility] [place]  Genetic Counselor  Phone: [phone]

## 2017-06-24 NOTE — PROGRESS NOTES
[facility] GENETIC COUNSELING NOTE  Jose was seen with his wife for a genetic counseling consult at the request of Dr. Li to discuss genetic testing for metabolic myopathy. Jose has had two episodes of serious rhabdomyolysis of unknown etiology.     FAMILY HISTORY  A detailed family history was taken and scanned into Jose s medical record. Jose' family history for the following:    His maternal uncle was reported to have recurrent muscle cramps      Ethnic background is Scandinavian, [place] and English.    SUMMARY OF PAST GENETIC TESTING   To date testing has not identified an etiology. 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  He has had an extensive work-up for this condition and etiology remains unknown.  Due to the heterogeneous nature of this condition comprehensive testing is necessary to determine the cause. Genetic confirmation has a significant likelihood of providing  an accurate diagnosis, thereby ensuring appropriate medical management. In many cases, a diagnosis can lead to a specific treatment or management strategy that dramatically changes the clinical outcome. A diagnosis can also help identify necessary medical referrals, screening for associated complications, and recurrence risk counseling. Once a diagnosis is made, the costs associated with further testing are likely to decrease.    Whole exome sequencing (UMANG):  We discussed how UMANG looks at the exome or the coding parts of the genes to look for gene changes that may explain Jose's symptoms. UMANG can accurately evaluate around 90-95% of the exome. However, the exome is still a small portion of a person's total DNA. Therefore, although UMANG analyzes the DNA of close to 20,000 genes, there are limitations with this testing method. Approximately 30% of individuals who undergo UMANG will have a positive  result, leading to a diagnosis.  Many will not have an identifiable change or mutation using UMANG; this does not rule out a genetic cause for the patient's symptoms.     UMANG Results:  We reviewed that there are three types of results that can be obtained from UMANG:  o One possibility is a change(s) could be seen in Jose and this change(s) is known to cause similar symptoms to the symptoms Jose has experienced. This is considered a positive result. A positive result may provide more information on appropriate clinical management for Jose and may provide information on additional potential health risks associated with Jose's diagnosis. A positive result can also have implications for the health and reproductive risks of other relatives.   o It is also possible that no change(s) that are likely to explain Jose's symptoms are found from UMANG. This is considered a negative result. A negative result would not completely rule out a possible genetic cause for Jose's symptoms. We discussed that in the event of a negative result, there additional options for re-analysis of the results. Including research evaluation of results and review in a year using new information obtained about genetic etiology of conditions.  o Not all changes in our genes cause disease. Sometimes, it can be difficult for the laboratory to determine whether or not a change that is found contributes to the patient's symptoms. If the meaning of a particular gene change is unknown, the lab classifies the result as a variant of unknown significance  Due to the heterogeneous nature of this condition there is a reasonable probability of detecting a genetic mutation using this test. Genetic confirmation has a significant likelihood of providing my patient and his family with an accurate diagnosis, thereby ensuring appropriate medical management. In many cases, a diagnosis can lead to a specific treatment or management strategy that dramatically changes the  clinical outcome. A diagnosis can also help identify necessary medical referrals, screening for associated complications, and recurrence risk counseling. Once a diagnosis is made, the costs associated with further testing are likely to decrease..     Parental Samples:  We discussed that samples from Jose's parents or other relative could be included in the analysis to help determine if gene changes that are found are disease causing or benign. Only changes that are found in Jose that may contributed to his symptoms will be tested for in his parents and only gene changes that the laboratory believes may contribute to Jose's symptoms will be reported. Changes and variants in genes that are not thought to contribute to Jose's symptoms will not be included in the results report and will not be tested for in the parents.     [facility] Secondary Findings:  We reviewed that [facility] may also report the results of gene mutations that are found in 56 genes recommended by the [employer] ([facility]) to be reported to UMANG patients even if the gene mutation does not contribute to their current symptoms. Jose was given the option to learn the results of these 56 genes (i.e. secondary findings).    Insurance Coverage for UMANG:  A benefits inquiry will be completed by [employer] and an estimated out of pocket expense will be provided. The cost will not exceed this quoted price. A further price reduction is possible if family income data is provided to [employer]. [facility] will bill the insurance company directly for this testing.   PLAN    Jose will be contacted with out of pocket costs for testing and a follow-up plan will be made.    A copy of the test information sheet was provided to the Jose    Contact information was provided. Additional questions or concerns were denied.    Winnie Shabazz MS, [facility] [place]  Genetic Counselor  Phone: [phone]    Twenty minutes was spent with the patient and more than 50% of the time  was spent in counseling and coordination of care.

## 2019-10-01 ENCOUNTER — HEALTH MAINTENANCE LETTER (OUTPATIENT)
Age: 34
End: 2019-10-01

## 2021-01-15 ENCOUNTER — HEALTH MAINTENANCE LETTER (OUTPATIENT)
Age: 36
End: 2021-01-15

## 2021-05-26 ENCOUNTER — RECORDS - HEALTHEAST (OUTPATIENT)
Dept: ADMINISTRATIVE | Facility: CLINIC | Age: 36
End: 2021-05-26

## 2021-09-04 ENCOUNTER — HEALTH MAINTENANCE LETTER (OUTPATIENT)
Age: 36
End: 2021-09-04

## 2022-02-13 ENCOUNTER — HEALTH MAINTENANCE LETTER (OUTPATIENT)
Age: 37
End: 2022-02-13

## 2022-10-16 ENCOUNTER — HEALTH MAINTENANCE LETTER (OUTPATIENT)
Age: 37
End: 2022-10-16

## 2023-03-26 ENCOUNTER — HEALTH MAINTENANCE LETTER (OUTPATIENT)
Age: 38
End: 2023-03-26